# Patient Record
(demographics unavailable — no encounter records)

---

## 2019-01-03 NOTE — DIAGNOSTIC IMAGING REPORT
EXAM: CT Abdomen and Pelvis WITH contrast  



INDICATION: Left sided abdominal pain



COMPARISON: None.



TECHNIQUE: Abdomen and pelvis were scanned utilizing a multidetector helical

scanner from the lung base to the pubic symphysis after administration of IV

contrast. Coronal and sagittal reformations were obtained. Routine protocol was

performed. Scan was performed when during portal venous phase.    

     IV CONTRAST: 100 cc of Isovue 370.

     ORAL CONTRAST: Water

            

COMPLICATIONS: None



RADIATION DOSE:

     Total DLP: 564.2 mGy*cm

     CTDIvol has been reviewed. It is below the limits set by the Radiation

Protocol Committee (RPC).



FINDINGS:

LINES and TUBES: None.



LOWER THORAX:  Moderate hiatal hernia. Mild circumferential thickening of the

distal esophagus. The lung bases are unremarkable. 



HEPATOBILIARY: No evidence of focal lesion. No biliary ductal dilation. 



GALLBLADDER: No radio-opaque stones or sludge.  No wall thickening.



SPLEEN: No splenomegaly. 



PANCREAS: No focal masses or ductal dilatation.  



ADRENALS: No adrenal nodules 



KIDNEYS/URETERS: Kidneys enhance symmetrically. No evidence of hydronephrosis,

solid mass, or stone. 



GI TRACT: No evidence of distension. Appendix is normal. There is scattered

colonic diverticulosis without CT evidence of diverticulitis.  The transverse

colon is decompressed, likely accounting for mild apparent wall thickening. 



PELVIC ORGANS/BLADDER: Unremarkable.



LYMPH NODES: No lymphadenopathy.



VESSELS: Moderate atherosclerotic changes of the abdominal aorta and branch

vessels. 



PERITONEUM / RETROPERITONEUM: No free air or fluid.



BONES AND SOFT TISSUES: No acute osseous abnormality. No suspicious lytic or

blastic lesions. Mild degenerative changes at L5-S1.



CONCLUSION:

No acute findings in the abdomen or pelvis.



Moderate hiatal hernia. Mild circumferential thickening of the distal

esophagus, which may reflect esophagitis in the setting of reflux. Correlation

with clinical symptoms is suggested. 



Colonic diverticulosis without evidence of diverticulitis. 



Signed by: Dr. Bayron Bhatt MD on 1/3/2019 9:28 AM